# Patient Record
(demographics unavailable — no encounter records)

---

## 2024-10-08 NOTE — HISTORY OF PRESENT ILLNESS
[FreeTextEntry1] : 78 yo P6 postmenopausal here for annual exam. Patient had LeFort colpocleisis 5/2024 with Dr. Napoles for complete uterovaginal prolapse. She is very happy with results. Denies pain, bleeding, urinary complaints.   All prior pap smears normal Had mammogram recently, managed by primary care doctor Has DEXA scheduled Last colonoscopy 2019

## 2024-10-08 NOTE — DISCUSSION/SUMMARY
[FreeTextEntry1] : 78 yo P6, s/p lefort colpocleisis, annual exam.  - patient declined physical exam - discussed that there is no need for further GYN exams unless problems arise (discharge, bleeding, urinary complaints, etc) - return to office PRN

## 2025-03-27 NOTE — HISTORY OF PRESENT ILLNESS
[de-identified] : 80-year-old woman history of hypertension hyperlipidemia and non-insulin-dependent diabetes with a remote history of stroke 17 years ago resulting in right upper and lower extremity weakness presents for evaluation of severe right knee pain that she has had for a long time.  At baseline she is a assistive device dependent household ambulator.  She presents accompanied by her son.  Looking for some relief to her knee pain.  She is amenable to home-based physical therapy.  She is not currently enrolled in physical therapy.  Past medical history: Non-insulin-dependent diabetes unclear A1c but presumably relatively well-controlled according to the son Hypertension Hyperlipidemia  Medications Metformin Losartan Triamterene-hydrochlorothiazide Atorvastatin Low-dose aspirin Amlodipine  Primary medical doctor: Kar PRESCOTT  Social: Lives with son, Limited ambulator, no EtOH or drug use

## 2025-03-27 NOTE — IMAGING
[de-identified] : Pleasant middle-age woman sits reasonably comfortably in her wheelchair.  She accompanied by her son in exam room.  Physical examination: Right knee: Moderate synovial thickening.  Tenderness palpation of the joint line especially laterally.  Abnormal patellofemoral grind test.  Knee motion 0-115 degrees without undue discomfort.  Can extend her knee fully against gravity without difficulty  Radiographs: Right knee (AP, lateral): Advanced lateral compartment knee arthritis with neutral alignment of her knee.  There are some evidence of patellofemoral arthritis.

## 2025-03-27 NOTE — ASSESSMENT
[FreeTextEntry1] : 80-year-old woman with moderate to significant right knee arthritis and weakness in her right leg related to a stroke.  Would start by treating this conservatively.  Consider cortisone injection to mitigate some of her symptoms, physical therapy at home to improve function and strength in her knee, judicious use of a topical nonsteroidal anti-inflammatory medication.  Would supplement with Tylenol as needed for pain relief.  Have her follow-up in my office in 3 to 6 months to see if she is having adequate relief of symptoms.  Plan reviewed with the patient and her son.  They agree with the plan.  Procedure: With the patient's consent and at her request utilizing standard sterile technique patient was provided an injection of lidocaine 1% (4 cc), Marcaine 0.25% (4 cc) and dexamethasone 10 mcg/cc (2 cc) into the right knee through a anterior medial portal.  Patient injection without issue.  Postinjection precautions were discussed.